# Patient Record
Sex: FEMALE | Race: WHITE | NOT HISPANIC OR LATINO
[De-identification: names, ages, dates, MRNs, and addresses within clinical notes are randomized per-mention and may not be internally consistent; named-entity substitution may affect disease eponyms.]

---

## 2018-10-26 ENCOUNTER — APPOINTMENT (OUTPATIENT)
Dept: DERMATOLOGY | Facility: CLINIC | Age: 83
End: 2018-10-26
Payer: MEDICARE

## 2018-10-26 ENCOUNTER — RESULT REVIEW (OUTPATIENT)
Age: 83
End: 2018-10-26

## 2018-10-26 PROCEDURE — 99213 OFFICE O/P EST LOW 20 MIN: CPT | Mod: 25

## 2018-10-26 PROCEDURE — 10060 I&D ABSCESS SIMPLE/SINGLE: CPT | Mod: 59

## 2018-10-26 PROCEDURE — 11100 BX SKIN SUBCUTANEOUS&/MUCOUS MEMBRANE 1 LESION: CPT | Mod: 59

## 2018-10-26 PROCEDURE — 17000 DESTRUCT PREMALG LESION: CPT

## 2018-11-27 ENCOUNTER — APPOINTMENT (OUTPATIENT)
Dept: DERMATOLOGY | Facility: CLINIC | Age: 83
End: 2018-11-27
Payer: MEDICARE

## 2018-11-27 PROCEDURE — 99213 OFFICE O/P EST LOW 20 MIN: CPT | Mod: 25

## 2018-11-27 PROCEDURE — 17000 DESTRUCT PREMALG LESION: CPT

## 2018-11-27 PROCEDURE — 17003 DESTRUCT PREMALG LES 2-14: CPT

## 2019-03-21 ENCOUNTER — APPOINTMENT (OUTPATIENT)
Dept: INTERNAL MEDICINE | Facility: CLINIC | Age: 84
End: 2019-03-21
Payer: MEDICARE

## 2019-03-21 VITALS
WEIGHT: 120 LBS | DIASTOLIC BLOOD PRESSURE: 68 MMHG | SYSTOLIC BLOOD PRESSURE: 98 MMHG | RESPIRATION RATE: 14 BRPM | HEIGHT: 61 IN | BODY MASS INDEX: 22.66 KG/M2

## 2019-03-21 DIAGNOSIS — Z86.79 OTHER SPECIFIED POSTPROCEDURAL STATES: ICD-10-CM

## 2019-03-21 DIAGNOSIS — Z87.891 PERSONAL HISTORY OF NICOTINE DEPENDENCE: ICD-10-CM

## 2019-03-21 DIAGNOSIS — Z98.890 OTHER SPECIFIED POSTPROCEDURAL STATES: ICD-10-CM

## 2019-03-21 PROCEDURE — 36415 COLL VENOUS BLD VENIPUNCTURE: CPT

## 2019-03-21 PROCEDURE — 99214 OFFICE O/P EST MOD 30 MIN: CPT | Mod: 25

## 2019-03-21 RX ORDER — BRIMONIDINE TARTRATE 2 MG/MG
0.2 SOLUTION/ DROPS OPHTHALMIC
Qty: 5 | Refills: 0 | Status: ACTIVE | COMMUNITY
Start: 2018-11-27

## 2019-03-21 RX ORDER — TIMOLOL MALEATE 5 MG/ML
0.5 SOLUTION OPHTHALMIC
Qty: 5 | Refills: 0 | Status: ACTIVE | COMMUNITY
Start: 2018-10-24

## 2019-03-21 RX ORDER — GLUC/MSM/COLGN2/HYAL/ANTIARTH3 375-375-20
TABLET ORAL
Refills: 0 | Status: ACTIVE | COMMUNITY

## 2019-03-21 RX ORDER — NEOMYCIN AND POLYMYXIN B SULFATES AND DEXAMETHASONE 3.5; 10000; 1 MG/G; [IU]/G; MG/G
3.5-10000-0.1 OINTMENT OPHTHALMIC
Qty: 3 | Refills: 0 | Status: DISCONTINUED | COMMUNITY
Start: 2018-12-31

## 2019-03-21 RX ORDER — LATANOPROST/PF 0.005 %
0.01 DROPS OPHTHALMIC (EYE)
Qty: 7 | Refills: 0 | Status: ACTIVE | COMMUNITY
Start: 2019-02-04

## 2019-03-21 NOTE — PHYSICAL EXAM
[No Acute Distress] : no acute distress [No Respiratory Distress] : no respiratory distress  [Normal Rate] : normal rate  [Soft] : abdomen soft [de-identified] : 2/6 syst murmor

## 2019-03-21 NOTE — HISTORY OF PRESENT ILLNESS
[de-identified] : cardio (martha johnson) is ordering cta heart and pet scan--pt has recent subtle loss of exertion--had echo and ett--hx ms (blank)--last on maintenance meds 3 yrs ag--no progression on mri by hx. cardio wants pt to get labs

## 2019-03-21 NOTE — ASSESSMENT
[FreeTextEntry1] : as noted\par \par labs ordered\par cpx 6 months--pt to ask cardio to forward results of dx tests

## 2019-03-22 LAB
ALBUMIN SERPL ELPH-MCNC: 4 G/DL
ALP BLD-CCNC: 67 U/L
ALT SERPL-CCNC: 20 U/L
ANION GAP SERPL CALC-SCNC: 15 MMOL/L
AST SERPL-CCNC: 23 U/L
BASOPHILS # BLD AUTO: 0.03 K/UL
BASOPHILS NFR BLD AUTO: 0.8 %
BILIRUB SERPL-MCNC: 0.5 MG/DL
BUN SERPL-MCNC: 16 MG/DL
CALCIUM SERPL-MCNC: 9.4 MG/DL
CHLORIDE SERPL-SCNC: 104 MMOL/L
CHOLEST SERPL-MCNC: 131 MG/DL
CHOLEST/HDLC SERPL: 1.8 RATIO
CO2 SERPL-SCNC: 27 MMOL/L
CREAT SERPL-MCNC: 0.87 MG/DL
EOSINOPHIL # BLD AUTO: 0.05 K/UL
EOSINOPHIL NFR BLD AUTO: 1.3 %
FERRITIN SERPL-MCNC: 14 NG/ML
GLUCOSE SERPL-MCNC: 62 MG/DL
HCT VFR BLD CALC: 35.2 %
HDLC SERPL-MCNC: 73 MG/DL
HGB BLD-MCNC: 10 G/DL
IMM GRANULOCYTES NFR BLD AUTO: 0.5 %
LDLC SERPL CALC-MCNC: 48 MG/DL
LYMPHOCYTES # BLD AUTO: 0.78 K/UL
LYMPHOCYTES NFR BLD AUTO: 20.8 %
MAN DIFF?: NORMAL
MCHC RBC-ENTMCNC: 27.8 PG
MCHC RBC-ENTMCNC: 28.4 GM/DL
MCV RBC AUTO: 97.8 FL
MONOCYTES # BLD AUTO: 0.41 K/UL
MONOCYTES NFR BLD AUTO: 10.9 %
NEUTROPHILS # BLD AUTO: 2.46 K/UL
NEUTROPHILS NFR BLD AUTO: 65.7 %
PLATELET # BLD AUTO: 257 K/UL
POTASSIUM SERPL-SCNC: 5.1 MMOL/L
PROT SERPL-MCNC: 5.9 G/DL
RBC # BLD: 3.6 M/UL
RBC # FLD: 16.1 %
SODIUM SERPL-SCNC: 146 MMOL/L
TRIGL SERPL-MCNC: 48 MG/DL
TSH SERPL-ACNC: 4.58 UIU/ML
WBC # FLD AUTO: 3.75 K/UL

## 2019-04-12 ENCOUNTER — APPOINTMENT (OUTPATIENT)
Dept: INTERNAL MEDICINE | Facility: CLINIC | Age: 84
End: 2019-04-12
Payer: MEDICARE

## 2019-04-12 VITALS
WEIGHT: 120 LBS | SYSTOLIC BLOOD PRESSURE: 110 MMHG | BODY MASS INDEX: 22.66 KG/M2 | HEIGHT: 61 IN | DIASTOLIC BLOOD PRESSURE: 68 MMHG | RESPIRATION RATE: 16 BRPM

## 2019-04-12 DIAGNOSIS — Z87.19 PERSONAL HISTORY OF OTHER DISEASES OF THE DIGESTIVE SYSTEM: ICD-10-CM

## 2019-04-12 PROCEDURE — 99214 OFFICE O/P EST MOD 30 MIN: CPT

## 2019-04-12 NOTE — ASSESSMENT
[FreeTextEntry1] : discussed possible pulm eval and possibility of rebleed with osler's\par cbc q 3-4 months

## 2019-04-12 NOTE — HISTORY OF PRESENT ILLNESS
[de-identified] : Had ct scan by dr. Al Jimenez which showed possibility of osler-cortes rendu and possible pulm htn.Reviewed with pt--she has mild occ dyspnea

## 2019-04-12 NOTE — PHYSICAL EXAM
[No Acute Distress] : no acute distress [No Respiratory Distress] : no respiratory distress  [Clear to Auscultation] : lungs were clear to auscultation bilaterally [Normal Rate] : normal rate  [Soft] : abdomen soft

## 2019-05-08 ENCOUNTER — APPOINTMENT (OUTPATIENT)
Dept: INTERNAL MEDICINE | Facility: CLINIC | Age: 84
End: 2019-05-08
Payer: MEDICARE

## 2019-05-08 VITALS
SYSTOLIC BLOOD PRESSURE: 88 MMHG | HEIGHT: 61 IN | WEIGHT: 120 LBS | DIASTOLIC BLOOD PRESSURE: 50 MMHG | BODY MASS INDEX: 22.66 KG/M2 | RESPIRATION RATE: 16 BRPM

## 2019-05-08 PROCEDURE — 36415 COLL VENOUS BLD VENIPUNCTURE: CPT

## 2019-05-08 PROCEDURE — 99213 OFFICE O/P EST LOW 20 MIN: CPT | Mod: 25

## 2019-05-08 NOTE — HISTORY OF PRESENT ILLNESS
[de-identified] : reviewed again with patient the visit of 4/12. She has periodic dyspnea ?progressive?-has an "allergic" cough..on slow release iron

## 2019-05-09 LAB
BASOPHILS # BLD AUTO: 0.03 K/UL
BASOPHILS NFR BLD AUTO: 0.7 %
EOSINOPHIL # BLD AUTO: 0.04 K/UL
EOSINOPHIL NFR BLD AUTO: 0.9 %
HCT VFR BLD CALC: 29.9 %
HGB BLD-MCNC: 8.7 G/DL
IMM GRANULOCYTES NFR BLD AUTO: 0.2 %
LYMPHOCYTES # BLD AUTO: 1.09 K/UL
LYMPHOCYTES NFR BLD AUTO: 24.4 %
MAN DIFF?: NORMAL
MCHC RBC-ENTMCNC: 28.3 PG
MCHC RBC-ENTMCNC: 29.1 GM/DL
MCV RBC AUTO: 97.4 FL
MONOCYTES # BLD AUTO: 0.5 K/UL
MONOCYTES NFR BLD AUTO: 11.2 %
NEUTROPHILS # BLD AUTO: 2.8 K/UL
NEUTROPHILS NFR BLD AUTO: 62.6 %
PLATELET # BLD AUTO: 241 K/UL
RBC # BLD: 3.07 M/UL
RBC # FLD: 16.2 %
WBC # FLD AUTO: 4.47 K/UL

## 2019-06-10 ENCOUNTER — APPOINTMENT (OUTPATIENT)
Dept: DERMATOLOGY | Facility: CLINIC | Age: 84
End: 2019-06-10

## 2019-06-19 ENCOUNTER — APPOINTMENT (OUTPATIENT)
Dept: INTERNAL MEDICINE | Facility: CLINIC | Age: 84
End: 2019-06-19
Payer: MEDICARE

## 2019-06-19 VITALS
HEIGHT: 61 IN | RESPIRATION RATE: 16 BRPM | WEIGHT: 120 LBS | DIASTOLIC BLOOD PRESSURE: 76 MMHG | BODY MASS INDEX: 22.66 KG/M2 | SYSTOLIC BLOOD PRESSURE: 130 MMHG

## 2019-06-19 PROCEDURE — 99214 OFFICE O/P EST MOD 30 MIN: CPT

## 2019-06-19 NOTE — ASSESSMENT
[FreeTextEntry1] : lasix 2-3 x /wk\par venous doppler\par heme f/u \par venous compression stockings

## 2019-06-19 NOTE — PHYSICAL EXAM
[No Acute Distress] : no acute distress [No Respiratory Distress] : no respiratory distress  [Clear to Auscultation] : lungs were clear to auscultation bilaterally [Normal Rate] : normal rate  [Regular Rhythm] : with a regular rhythm [Soft] : abdomen soft [de-identified] : 2+ leg edema--no open lesions--some mild weeping

## 2019-07-17 ENCOUNTER — RX CHANGE (OUTPATIENT)
Age: 84
End: 2019-07-17

## 2019-07-17 ENCOUNTER — RX RENEWAL (OUTPATIENT)
Age: 84
End: 2019-07-17

## 2019-07-18 ENCOUNTER — APPOINTMENT (OUTPATIENT)
Dept: PULMONOLOGY | Facility: CLINIC | Age: 84
End: 2019-07-18
Payer: MEDICARE

## 2019-07-18 VITALS — SYSTOLIC BLOOD PRESSURE: 120 MMHG | DIASTOLIC BLOOD PRESSURE: 70 MMHG

## 2019-07-18 VITALS — WEIGHT: 121 LBS | BODY MASS INDEX: 23.75 KG/M2 | OXYGEN SATURATION: 92 % | HEART RATE: 53 BPM | HEIGHT: 60 IN

## 2019-07-18 PROCEDURE — 94664 DEMO&/EVAL PT USE INHALER: CPT | Mod: 59

## 2019-07-18 PROCEDURE — 94060 EVALUATION OF WHEEZING: CPT

## 2019-07-18 PROCEDURE — 99204 OFFICE O/P NEW MOD 45 MIN: CPT | Mod: 25

## 2019-07-18 PROCEDURE — 85018 HEMOGLOBIN: CPT | Mod: QW

## 2019-07-18 NOTE — CONSULT LETTER
[Dear  ___] : Dear  [unfilled], [Consult Letter:] : I had the pleasure of evaluating your patient, [unfilled]. [Please see my note below.] : Please see my note below. [Consult Closing:] : Thank you very much for allowing me to participate in the care of this patient.  If you have any questions, please do not hesitate to contact me. [Sincerely,] : Sincerely, [DrDanilo  ___] : Dr. SCHULTZ

## 2019-07-23 ENCOUNTER — MESSAGE (OUTPATIENT)
Age: 84
End: 2019-07-23

## 2019-07-29 NOTE — PHYSICAL EXAM
[General Appearance - Well Developed] : well developed [General Appearance - Well Nourished] : well nourished [Retrognathia] : retrognathia [Neck Appearance] : the appearance of the neck was normal [Jugular Venous Distention Increased] : there was no jugular-venous distention [Thyroid Diffuse Enlargement] : the thyroid was not enlarged [Heart Sounds] : normal S1 and S2 [Arterial Pulses Normal] : the arterial pulses were normal [Respiration, Rhythm And Depth] : normal respiratory rhythm and effort [Auscultation Breath Sounds / Voice Sounds] : lungs were clear to auscultation bilaterally [Lungs Percussion] : the lungs were normal to percussion [Bowel Sounds] : normal bowel sounds [Abdomen Soft] : soft [Abdomen Tenderness] : non-tender [Nail Clubbing] : no clubbing of the fingernails [Cyanosis, Localized] : no localized cyanosis [3+ Pitting] : 3+  pitting  [] : no rash [Oriented To Time, Place, And Person] : oriented to person, place, and time [Impaired Insight] : insight and judgment were intact [Affect] : the affect was normal [Memory Recent] : recent memory was not impaired [FreeTextEntry1] : high arched palate [FreeTextEntry2] : LE edema

## 2019-07-29 NOTE — HISTORY OF PRESENT ILLNESS
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

## 2019-07-29 NOTE — ASSESSMENT
[FreeTextEntry1] : Pulmonary Hypertension - not clearly related to HHT at all\par No PE\par Edema - anemia\par Kyphosis with restriction\par Sleep disorder breathing and nocturnal desaturation may contribute\par No benefit to bronchodilators

## 2019-08-13 ENCOUNTER — RX RENEWAL (OUTPATIENT)
Age: 84
End: 2019-08-13

## 2019-08-29 ENCOUNTER — APPOINTMENT (OUTPATIENT)
Dept: PULMONOLOGY | Facility: CLINIC | Age: 84
End: 2019-08-29
Payer: MEDICARE

## 2019-08-29 VITALS — OXYGEN SATURATION: 98 % | HEART RATE: 62 BPM

## 2019-08-29 VITALS — DIASTOLIC BLOOD PRESSURE: 80 MMHG | SYSTOLIC BLOOD PRESSURE: 118 MMHG

## 2019-08-29 PROCEDURE — 99214 OFFICE O/P EST MOD 30 MIN: CPT

## 2019-08-29 RX ORDER — FUROSEMIDE 20 MG/1
20 TABLET ORAL
Qty: 30 | Refills: 5 | Status: DISCONTINUED | COMMUNITY
Start: 2019-06-19 | End: 2019-08-29

## 2019-08-29 NOTE — CONSULT LETTER
[Dear  ___] : Dear  [unfilled], [Please see my note below.] : Please see my note below. [Consult Letter:] : I had the pleasure of evaluating your patient, [unfilled]. [Consult Closing:] : Thank you very much for allowing me to participate in the care of this patient.  If you have any questions, please do not hesitate to contact me. [Sincerely,] : Sincerely, [DrDanilo  ___] : Dr. SCHULTZ

## 2019-08-29 NOTE — PHYSICAL EXAM
[General Appearance - Well Developed] : well developed [General Appearance - Well Nourished] : well nourished [Retrognathia] : retrognathia [Neck Appearance] : the appearance of the neck was normal [Jugular Venous Distention Increased] : there was no jugular-venous distention [Thyroid Diffuse Enlargement] : the thyroid was not enlarged [Heart Sounds] : normal S1 and S2 [Arterial Pulses Normal] : the arterial pulses were normal [Respiration, Rhythm And Depth] : normal respiratory rhythm and effort [Auscultation Breath Sounds / Voice Sounds] : lungs were clear to auscultation bilaterally [Lungs Percussion] : the lungs were normal to percussion [Bowel Sounds] : normal bowel sounds [Abdomen Soft] : soft [Abdomen Tenderness] : non-tender [Nail Clubbing] : no clubbing of the fingernails [Cyanosis, Localized] : no localized cyanosis [3+ Pitting] : 3+  pitting  [Oriented To Time, Place, And Person] : oriented to person, place, and time [Impaired Insight] : insight and judgment were intact [Affect] : the affect was normal [Memory Recent] : recent memory was not impaired [] : no rash [FreeTextEntry1] : high arched palate [FreeTextEntry2] : LE edema

## 2019-08-29 NOTE — HISTORY OF PRESENT ILLNESS
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

## 2019-11-05 ENCOUNTER — LABORATORY RESULT (OUTPATIENT)
Age: 84
End: 2019-11-05

## 2019-11-05 ENCOUNTER — APPOINTMENT (OUTPATIENT)
Dept: INTERNAL MEDICINE | Facility: CLINIC | Age: 84
End: 2019-11-05
Payer: MEDICARE

## 2019-11-05 VITALS
DIASTOLIC BLOOD PRESSURE: 80 MMHG | HEIGHT: 60 IN | HEART RATE: 78 BPM | WEIGHT: 153 LBS | TEMPERATURE: 97.8 F | SYSTOLIC BLOOD PRESSURE: 130 MMHG | BODY MASS INDEX: 30.04 KG/M2 | RESPIRATION RATE: 16 BRPM

## 2019-11-05 DIAGNOSIS — D64.9 ANEMIA, UNSPECIFIED: ICD-10-CM
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
## 2019-11-05 DIAGNOSIS — R60.0 LOCALIZED EDEMA: ICD-10-CM

## 2019-11-05 DIAGNOSIS — R00.0 TACHYCARDIA, UNSPECIFIED: ICD-10-CM
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
## 2019-11-05 PROCEDURE — G0008: CPT

## 2019-11-05 PROCEDURE — 36415 COLL VENOUS BLD VENIPUNCTURE: CPT

## 2019-11-05 PROCEDURE — 99214 OFFICE O/P EST MOD 30 MIN: CPT | Mod: 25

## 2019-11-05 PROCEDURE — 90653 IIV ADJUVANT VACCINE IM: CPT

## 2019-11-05 RX ORDER — ATENOLOL 25 MG/1
25 TABLET ORAL DAILY
Qty: 30 | Refills: 3 | Status: DISCONTINUED | COMMUNITY
Start: 2018-01-30 | End: 2019-11-05

## 2019-11-05 NOTE — HISTORY OF PRESENT ILLNESS
[FreeTextEntry8] : fell 1 wk ago--got dizzy after arising-fell and injured face--no loc or cardio/ neuro symps since fall-stable ambulation with rollator-being seen at [place] at [facility] and to see hematology (narciso)--got prbcs iv and iron 2 months ago--went off atenolol and lasix due to low bp--reviewed pulm, cardio notes. To see PTx specialist for leg  edema

## 2019-11-05 NOTE — PHYSICAL EXAM
[Normal Sclera/Conjunctiva] : normal sclera/conjunctiva [PERRL] : pupils equal round and reactive to light [EOMI] : extraocular movements intact [Normal] : soft, non-tender, non-distended, no masses palpated, no HSM and normal bowel sounds [de-identified] : extensive facial bruising--no local facial pain--small hematoma on forehead [de-identified] : 2/6 syst murmor [de-identified] : 2-3+edema of legs

## 2019-11-06 LAB
ANION GAP SERPL CALC-SCNC: 10 MMOL/L
BASOPHILS # BLD AUTO: 0.04 K/UL
BASOPHILS NFR BLD AUTO: 1.3 %
BUN SERPL-MCNC: 15 MG/DL
CALCIUM SERPL-MCNC: 9.2 MG/DL
CHLORIDE SERPL-SCNC: 97 MMOL/L
CO2 SERPL-SCNC: 29 MMOL/L
CREAT SERPL-MCNC: 0.63 MG/DL
EOSINOPHIL # BLD AUTO: 0.06 K/UL
EOSINOPHIL NFR BLD AUTO: 2 %
GLUCOSE SERPL-MCNC: 94 MG/DL
HCT VFR BLD CALC: 30.5 %
HGB BLD-MCNC: 9 G/DL
IMM GRANULOCYTES NFR BLD AUTO: 0.3 %
LYMPHOCYTES # BLD AUTO: 0.47 K/UL
LYMPHOCYTES NFR BLD AUTO: 15.5 %
MAN DIFF?: NORMAL
MCHC RBC-ENTMCNC: 26.6 PG
MCHC RBC-ENTMCNC: 29.5 GM/DL
MCV RBC AUTO: 90.2 FL
MONOCYTES # BLD AUTO: 0.53 K/UL
MONOCYTES NFR BLD AUTO: 17.5 %
NEUTROPHILS # BLD AUTO: 1.92 K/UL
NEUTROPHILS NFR BLD AUTO: 63.4 %
PLATELET # BLD AUTO: 260 K/UL
POTASSIUM SERPL-SCNC: 5 MMOL/L
RBC # BLD: 3.38 M/UL
RBC # FLD: 16.2 %
SODIUM SERPL-SCNC: 136 MMOL/L
T4 FREE SERPL-MCNC: 1.4 NG/DL
THYROGLOB AB SERPL-ACNC: <20 IU/ML
THYROPEROXIDASE AB SERPL IA-ACNC: <10 IU/ML
TSH SERPL-ACNC: 7.76 UIU/ML
WBC # FLD AUTO: 3.03 K/UL

## 2019-12-27 ENCOUNTER — APPOINTMENT (OUTPATIENT)
Dept: PULMONOLOGY | Facility: CLINIC | Age: 84
End: 2019-12-27
Payer: MEDICARE

## 2019-12-27 VITALS — OXYGEN SATURATION: 96 % | SYSTOLIC BLOOD PRESSURE: 110 MMHG | HEART RATE: 77 BPM | DIASTOLIC BLOOD PRESSURE: 80 MMHG

## 2019-12-27 DIAGNOSIS — R09.02 HYPOXEMIA: ICD-10-CM

## 2019-12-27 DIAGNOSIS — I27.20 PULMONARY HYPERTENSION, UNSPECIFIED: ICD-10-CM

## 2019-12-27 DIAGNOSIS — G35 MULTIPLE SCLEROSIS: ICD-10-CM

## 2019-12-27 DIAGNOSIS — I78.0 HEREDITARY HEMORRHAGIC TELANGIECTASIA: ICD-10-CM

## 2019-12-27 DIAGNOSIS — Z99.81 HYPOXEMIA: ICD-10-CM

## 2019-12-27 DIAGNOSIS — R06.09 OTHER FORMS OF DYSPNEA: ICD-10-CM

## 2019-12-27 PROCEDURE — 99214 OFFICE O/P EST MOD 30 MIN: CPT

## 2019-12-27 NOTE — ASSESSMENT
[FreeTextEntry1] : Hereditary Hemorrhagic Telangiectasia\par Multiple Sclerosis\par Pulmonary Hypertension - not clearly related to HHT at all\par No PE\par Edema - anemia\par Kyphosis with restriction\par Sleep disorder breathing and nocturnal desaturation may contribute\par No benefit to bronchodilators\par No exertional desaturation\par Now Hospice appropriate

## 2019-12-27 NOTE — HISTORY OF PRESENT ILLNESS
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

## 2020-05-09 DIAGNOSIS — Z12.39 ENCOUNTER FOR OTHER SCREENING FOR MALIGNANT NEOPLASM OF BREAST: ICD-10-CM

## 2020-05-09 DIAGNOSIS — Z86.69 PERSONAL HISTORY OF OTHER DISEASES OF THE NERVOUS SYSTEM AND SENSE ORGANS: ICD-10-CM

## 2020-06-16 ENCOUNTER — APPOINTMENT (OUTPATIENT)
Dept: PULMONOLOGY | Facility: CLINIC | Age: 85
End: 2020-06-16

## 2023-10-06 NOTE — PHYSICAL EXAM
[General Appearance - Well Developed] : well developed [General Appearance - Well Nourished] : well nourished [Retrognathia] : retrognathia [Neck Appearance] : the appearance of the neck was normal [Jugular Venous Distention Increased] : there was no jugular-venous distention [Thyroid Diffuse Enlargement] : the thyroid was not enlarged [Heart Sounds] : normal S1 and S2 [Arterial Pulses Normal] : the arterial pulses were normal [Auscultation Breath Sounds / Voice Sounds] : lungs were clear to auscultation bilaterally [Respiration, Rhythm And Depth] : normal respiratory rhythm and effort [Lungs Percussion] : the lungs were normal to percussion [Bowel Sounds] : normal bowel sounds [Abdomen Soft] : soft [Abdomen Tenderness] : non-tender [Nail Clubbing] : no clubbing of the fingernails [Cyanosis, Localized] : no localized cyanosis [3+ Pitting] : 3+  pitting  [Oriented To Time, Place, And Person] : oriented to person, place, and time [Memory Recent] : recent memory was not impaired [Affect] : the affect was normal [Impaired Insight] : insight and judgment were intact [] : no rash [FreeTextEntry1] : high arched palate [FreeTextEntry2] : LE edema no